# Patient Record
Sex: MALE | Race: WHITE | URBAN - METROPOLITAN AREA
[De-identification: names, ages, dates, MRNs, and addresses within clinical notes are randomized per-mention and may not be internally consistent; named-entity substitution may affect disease eponyms.]

---

## 2020-09-09 PROBLEM — E03.9 ACQUIRED HYPOTHYROIDISM: Status: ACTIVE | Noted: 2020-09-09

## 2020-09-09 PROBLEM — E11.65 UNCONTROLLED TYPE 2 DIABETES MELLITUS WITH HYPERGLYCEMIA (HCC): Status: ACTIVE | Noted: 2020-09-09

## 2020-09-09 PROBLEM — E55.9 VITAMIN D DEFICIENCY: Status: ACTIVE | Noted: 2020-09-09

## 2020-09-10 ENCOUNTER — OFFICE VISIT (OUTPATIENT)
Dept: PRIMARY CARE CLINIC | Age: 60
End: 2020-09-10
Payer: MEDICARE

## 2020-09-10 VITALS
BODY MASS INDEX: 25.34 KG/M2 | TEMPERATURE: 97.3 F | SYSTOLIC BLOOD PRESSURE: 195 MMHG | DIASTOLIC BLOOD PRESSURE: 101 MMHG | HEART RATE: 85 BPM | WEIGHT: 181 LBS

## 2020-09-10 DIAGNOSIS — E55.9 VITAMIN D DEFICIENCY: ICD-10-CM

## 2020-09-10 DIAGNOSIS — E11.65 UNCONTROLLED TYPE 2 DIABETES MELLITUS WITH HYPERGLYCEMIA (HCC): ICD-10-CM

## 2020-09-10 LAB
A/G RATIO: 1.2 (ref 1.1–2.2)
ALBUMIN SERPL-MCNC: 3.7 G/DL (ref 3.4–5)
ALP BLD-CCNC: 64 U/L (ref 40–129)
ALT SERPL-CCNC: 13 U/L (ref 10–40)
ANION GAP SERPL CALCULATED.3IONS-SCNC: 12 MMOL/L (ref 3–16)
AST SERPL-CCNC: 24 U/L (ref 15–37)
BILIRUB SERPL-MCNC: 1 MG/DL (ref 0–1)
BUN BLDV-MCNC: 10 MG/DL (ref 7–20)
CALCIUM SERPL-MCNC: 9.4 MG/DL (ref 8.3–10.6)
CHLORIDE BLD-SCNC: 104 MMOL/L (ref 99–110)
CHOLESTEROL, TOTAL: 111 MG/DL (ref 0–199)
CO2: 26 MMOL/L (ref 21–32)
CREAT SERPL-MCNC: 1 MG/DL (ref 0.8–1.3)
GFR AFRICAN AMERICAN: >60
GFR NON-AFRICAN AMERICAN: >60
GLOBULIN: 3.1 G/DL
GLUCOSE BLD-MCNC: 180 MG/DL (ref 70–99)
HBA1C MFR BLD: 8 %
HDLC SERPL-MCNC: 41 MG/DL (ref 40–60)
LDL CHOLESTEROL CALCULATED: 54 MG/DL
POTASSIUM SERPL-SCNC: 3.7 MMOL/L (ref 3.5–5.1)
SODIUM BLD-SCNC: 142 MMOL/L (ref 136–145)
TOTAL PROTEIN: 6.8 G/DL (ref 6.4–8.2)
TRIGL SERPL-MCNC: 78 MG/DL (ref 0–150)
TSH SERPL DL<=0.05 MIU/L-ACNC: 4.52 UIU/ML (ref 0.27–4.2)
VLDLC SERPL CALC-MCNC: 16 MG/DL

## 2020-09-10 PROCEDURE — 99205 OFFICE O/P NEW HI 60 MIN: CPT | Performed by: FAMILY MEDICINE

## 2020-09-10 PROCEDURE — 3017F COLORECTAL CA SCREEN DOC REV: CPT | Performed by: FAMILY MEDICINE

## 2020-09-10 PROCEDURE — G8427 DOCREV CUR MEDS BY ELIG CLIN: HCPCS | Performed by: FAMILY MEDICINE

## 2020-09-10 PROCEDURE — 1036F TOBACCO NON-USER: CPT | Performed by: FAMILY MEDICINE

## 2020-09-10 PROCEDURE — 83036 HEMOGLOBIN GLYCOSYLATED A1C: CPT | Performed by: FAMILY MEDICINE

## 2020-09-10 PROCEDURE — G8419 CALC BMI OUT NRM PARAM NOF/U: HCPCS | Performed by: FAMILY MEDICINE

## 2020-09-10 PROCEDURE — 2022F DILAT RTA XM EVC RTNOPTHY: CPT | Performed by: FAMILY MEDICINE

## 2020-09-10 PROCEDURE — 3052F HG A1C>EQUAL 8.0%<EQUAL 9.0%: CPT | Performed by: FAMILY MEDICINE

## 2020-09-10 RX ORDER — LISINOPRIL 40 MG/1
40 TABLET ORAL DAILY
Qty: 90 TABLET | Refills: 1 | Status: SHIPPED | OUTPATIENT
Start: 2020-09-10 | End: 2020-12-30 | Stop reason: SDUPTHER

## 2020-09-10 RX ORDER — ASPIRIN 81 MG/1
81 TABLET ORAL DAILY
Qty: 30 TABLET | Refills: 3 | Status: SHIPPED | OUTPATIENT
Start: 2020-09-10

## 2020-09-10 RX ORDER — LEVOTHYROXINE SODIUM 0.07 MG/1
75 TABLET ORAL DAILY
Qty: 90 TABLET | Refills: 1 | Status: SHIPPED | OUTPATIENT
Start: 2020-09-10

## 2020-09-10 RX ORDER — METOPROLOL SUCCINATE 100 MG/1
100 TABLET, EXTENDED RELEASE ORAL DAILY
Qty: 30 TABLET | Refills: 3 | Status: SHIPPED | OUTPATIENT
Start: 2020-09-10

## 2020-09-10 RX ORDER — LANCETS
EACH MISCELLANEOUS
COMMUNITY
Start: 2019-07-12 | End: 2020-09-10

## 2020-09-10 RX ORDER — CLOPIDOGREL BISULFATE 75 MG/1
75 TABLET ORAL DAILY
Qty: 30 TABLET | Refills: 3 | Status: SHIPPED | OUTPATIENT
Start: 2020-09-10

## 2020-09-10 ASSESSMENT — PATIENT HEALTH QUESTIONNAIRE - PHQ9
SUM OF ALL RESPONSES TO PHQ9 QUESTIONS 1 & 2: 0
2. FEELING DOWN, DEPRESSED OR HOPELESS: 0
SUM OF ALL RESPONSES TO PHQ QUESTIONS 1-9: 0
1. LITTLE INTEREST OR PLEASURE IN DOING THINGS: 0
SUM OF ALL RESPONSES TO PHQ QUESTIONS 1-9: 0

## 2020-09-10 ASSESSMENT — ENCOUNTER SYMPTOMS
SHORTNESS OF BREATH: 0
CONSTIPATION: 0
SORE THROAT: 0
COUGH: 0
NAUSEA: 0
RHINORRHEA: 0
ABDOMINAL PAIN: 0
VOMITING: 0
COLOR CHANGE: 0
DIARRHEA: 0
EYE PAIN: 0

## 2020-09-10 NOTE — PATIENT INSTRUCTIONS
See me in 2 weeks. Get established with a Family Doctor and Cardiologist in [facility] as  Soon as possible. Examine your lifestyle and the barriers to bad and good habits and how you can design your life to make better choices    If you want to feel better these are the FUNDAMENTAL PILLARS of Wellness:    Make it EASY to do the RIGHT THINGS. 1)  You can choose to Get 150 min/week of moderate exercise (can talk but can't sing) or 75 min/week of vigorous exercise (can't talk)   This will enhance your sense of well being (Exercise is as good as medicine for depression.)    2)  You can choose to Get 7-9 hours of sleep per night    Detoxifies your brain, reduces risk of dementia    3)  You can choose to Strength Train 2 x a week on non-consecutive days   This will improve function and reduce risk of injury. Body weight type exercises such as Yoga and Pilates are good    4)  You can choose good nutrition. Only eat your goal weight (in lbs) x 10 calories/day and get 5 servings of Vegetables/day   Plant based diets reduce risk of heart attack/stroke and will help you feel full on less food. Avoid highly processed foods and processed carbohydrates. 5)  You can choose moderate alcohol intake < 1-2 drinks/day   Alcohol will disrupt your sleep and add calories to your day    6)  You can choose to develop a Charismatic/Supportive relationship. This will strengthen your resilience for the ups and downs. 7)  You can choose to Practice Mindfulness. An hour a day of prayer/meditation/gratitude will change your life! If you are trying to lose weight, here are some recommendations for weight loss:  Not every weight loss program is appropriate for everybody. ..  good online sources include Noom, The GI Diet or \"Primal diet\", Intermittent fasting. If you have diabetes treated with insulin be sure to ask me for specific guidance around meals.     Take your desired weight in pounds and multiply by 10 and that is your average daily calorie allowance. For example if you wish to weigh 170 lb x 10 = 1700 jaleel/day (this is how to gradually lose the weight and maintain your desired weight). Avoid soda/coke and all \"wet carbs\" => Drink ice water instead    Drink a large glass of ice water before meals and EAT SLOWLY (talk while you eat)! Rethink your hunger => it means your losing weight.     Minimize highly processed carbohydrates as they stimulate your appetite:  Specifically cut back on Bread, Rice, Pasta and Potatoes    Avoid eating calories after 6 pm

## 2020-09-10 NOTE — PROGRESS NOTES
Chief Complaint   Patient presents with    Diabetes    Coronary Artery Disease    Hyperlipidemia    Seizures    Thyroid Problem    Other     vitamin D        HPI:Gurwinder Varner presents for evaluation and management of multiple medical problems is a highly comorbid, complicated, and at risk patient. He has a history of coronary artery disease stating he had a heart attack but denies any intervention such as stenting or bypass surgery. He denies chest pain at this time but is not taking any cholesterol, anticoagulant medications. He is not taking all of the blood pressure medications he was supposed to be taking either. He has a history of seizure disorder but does not take any medicine for this and tells me that he is not had a seizure\" in a long time \". He has been working to manage his diabetes with weight loss and has been compliant with metformin. Has not been checking his blood sugars. He is taking and tolerating his lisinopril for his blood pressure. Notes no lightheadedness dizziness or cough. He has a history of hypothyroidism and has not been taking thyroid medication. He does note decreased energy. Review of Systems   Constitutional: Positive for fatigue. Negative for chills and fever. HENT: Negative for ear pain, rhinorrhea and sore throat. Eyes: Negative for pain and visual disturbance. Respiratory: Negative for cough and shortness of breath. Cardiovascular: Negative for chest pain and palpitations. Gastrointestinal: Negative for abdominal pain, constipation, diarrhea, nausea and vomiting. Genitourinary: Negative for dysuria and frequency. Musculoskeletal: Negative for joint swelling and myalgias. Skin: Negative for color change and rash. Neurological: Negative for weakness, numbness and headaches. Notes numbness and pain in bilateral feet   Hematological: Negative for adenopathy. Does not bruise/bleed easily.    Psychiatric/Behavioral: Negative for dysphoric mood, self-injury and suicidal ideas. The patient is not nervous/anxious. No Known Allergies  New Prescriptions    LEVOTHYROXINE (SYNTHROID) 75 MCG TABLET    Take 1 tablet by mouth daily     Current Outpatient Medications   Medication Sig Dispense Refill    metFORMIN (GLUCOPHAGE) 1000 MG tablet TAKE 1 TABLET BY MOUTH TWICE DAILY.  aspirin 81 MG EC tablet Take 1 tablet by mouth daily 30 tablet 3    clopidogrel (PLAVIX) 75 MG tablet Take 1 tablet by mouth daily 30 tablet 3    levothyroxine (SYNTHROID) 75 MCG tablet Take 1 tablet by mouth daily 90 tablet 1    metoprolol succinate (TOPROL XL) 100 MG extended release tablet Take 1 tablet by mouth daily 30 tablet 3    lisinopril (PRINIVIL;ZESTRIL) 40 MG tablet Take 1 tablet by mouth daily 90 tablet 1    atorvastatin (LIPITOR) 80 MG tablet Take 1 tablet by mouth nightly. 30 tablet 3     No current facility-administered medications for this visit.         Past Medical History:   Diagnosis Date    Acquired hypothyroidism 9/9/2020    CAD (coronary artery disease)     CVA (cerebral infarction)     Diabetes mellitus ([facility]     Hereditary spherocytosis ([facility]     Hyperlipidemia     Hypertension     Uncontrolled type 2 diabetes mellitus with hyperglycemia ([facility] 9/9/2020    Vitamin D deficiency 9/9/2020     Past Surgical History:   Procedure Laterality Date    SPLENECTOMY, TOTAL      VASECTOMY       Family History   Problem Relation Age of Onset    Diabetes Mother     Cancer Mother     Hypertension Father     Diabetes Father      Social History     Tobacco Use    Smoking status: Never Smoker    Smokeless tobacco: Never Used   Substance Use Topics    Alcohol use: No    Drug use: No       Objective   BP (!) 195/101   Pulse 85   Temp 97.3 °F (36.3 °C) (Oral)   Wt 181 lb (82.1 kg)   BMI 25.34 kg/m²   Wt Readings from Last 3 Encounters:   09/10/20 181 lb (82.1 kg)       Physical Exam  Constitutional:       Appearance: He is BUN 10 08/13/2015 0437    CREATININE 0.8 (L) 08/13/2015 0437        Component Value Date/Time    CALCIUM 9.0 08/13/2015 0437    ALKPHOS 61 08/06/2015 0428    AST 22 08/06/2015 0428    ALT 13 08/06/2015 0428    BILITOT 0.7 08/06/2015 0428          Lab Results   Component Value Date    WBC 17.1 (H) 08/13/2015    HGB 15.1 08/13/2015    HCT 41.9 08/13/2015    MCV 94.3 08/13/2015     (H) 08/13/2015     Lab Results   Component Value Date    LABA1C 9.9 10/09/2014    LABA1C 9.9 10/09/2014     Lab Results   Component Value Date    .4 10/09/2014    .4 10/09/2014     Lab Results   Component Value Date    LABA1C 8.0 09/10/2020     No components found for: CHLPL  Lab Results   Component Value Date    TRIG 193 (H) 10/09/2014     Lab Results   Component Value Date    HDL 38 (L) 10/09/2014     Lab Results   Component Value Date    LDLCALC 101 (H) 10/09/2014     Lab Results   Component Value Date    LABVLDL 39 10/09/2014         Assessment   Plan     1. Uncontrolled type 2 diabetes mellitus with hyperglycemia ([facility]  Counseled patient to continue his medications and praised efforts on diet. Encourage patient to examine his feet every night for skin breakdown due to loss of sensation  - Lipid Panel; Future  - Comprehensive Metabolic Panel; Future  - POCT glycosylated hemoglobin (Hb A1C)  - TSH without Reflex; Future  -  DIABETES FOOT EXAM  - lisinopril (PRINIVIL;ZESTRIL) 40 MG tablet; Take 1 tablet by mouth daily  Dispense: 90 tablet; Refill: 1    2. Seizure ([address]  Appears stable off medication at this time. [facility]    3. Essential hypertension  We will restart his metoprolol and continue his lisinopril and asked patient to come back to clinic in 2 weeks for recheck.   He is planning to move to [place] so this will have to be followed up there and likely have additional titration of medication  - metoprolol succinate (TOPROL XL) 100 MG extended release tablet; Take 1 tablet by mouth daily  Dispense: 30 tablet; Refill: 3  - lisinopril (PRINIVIL;ZESTRIL) 40 MG tablet; Take 1 tablet by mouth daily  Dispense: 90 tablet; Refill: 1    4. Pure hypercholesterolemia  Treating: We will check labs and follow-up in 2 weeks    5. Coronary artery disease involving native coronary artery of native heart, angina presence unspecified  We will restart patient's aspirin and Plavix and beta-blocker therapy. Encourage patient to establish with a cardiologist when he moved to [place]  - aspirin 81 MG EC tablet; Take 1 tablet by mouth daily  Dispense: 30 tablet; Refill: 3  - clopidogrel (PLAVIX) 75 MG tablet; Take 1 tablet by mouth daily  Dispense: 30 tablet; Refill: 3  - metoprolol succinate (TOPROL XL) 100 MG extended release tablet; Take 1 tablet by mouth daily  Dispense: 30 tablet; Refill: 3    6. Vitamin D deficiency  Check levels today  - Vitamin D 25 Hydroxy; Future    7. Screen for colon cancer  Encourage patient to complete colon cancer screening with [facility]; Future    8. Acquired hypothyroidism  Untreated at this time likely related to patient's fatigue. We will start his Synthroid and follow-up in 2 to 4 weeks  - levothyroxine (SYNTHROID) 75 MCG tablet; Take 1 tablet by mouth daily  Dispense: 90 tablet; Refill: 1  Discussed use, benefit, and side effects of prescribed medications. Barriers to medication compliance addressed. All patient questions answered. Pt voiced understanding.          Health Maintenance   Topic Date Due    Meningococcal (ACWY) vaccine (1 - Risk start before 7 months 4-dose series) 1960    Hib vaccine (1 of 1 - Risk 1-dose series) 06/30/1959    Diabetic foot exam  03/31/1968    Diabetic retinal exam  03/31/1968    Meningococcal B vaccine (1 of 4 - Increased Risk Bexsero 2-dose series) 03/31/1968    Diabetic microalbuminuria test  03/31/1976    Shingles Vaccine (1 of 2) 03/31/2008    Colon cancer screen colonoscopy 03/31/2008    A1C test (Diabetic or Prediabetic)  10/09/2015    Lipid screen  10/09/2015    TSH testing  08/05/2016    Potassium monitoring  08/13/2016    Creatinine monitoring  08/13/2016    Pneumococcal 0-64 years Vaccine (3 of 3 - PPSV23) 01/11/2018    Flu vaccine (1) 09/01/2020    Annual Wellness Visit (AWV)  09/02/2020    DTaP/Tdap/Td vaccine (2 - Td) 09/07/2021    Hepatitis C screen  Completed    HIV screen  Completed    Hepatitis A vaccine  Aged Out       RTC 2 weeks

## 2020-09-11 LAB — VITAMIN D 25-HYDROXY: 21.1 NG/ML

## 2020-09-11 RX ORDER — ERGOCALCIFEROL 1.25 MG/1
50000 CAPSULE ORAL WEEKLY
Qty: 12 CAPSULE | Refills: 1 | Status: SHIPPED | OUTPATIENT
Start: 2020-09-11

## 2020-09-15 ENCOUNTER — TELEPHONE (OUTPATIENT)
Dept: PRIMARY CARE CLINIC | Age: 60
End: 2020-09-15

## 2020-09-24 ENCOUNTER — TELEPHONE (OUTPATIENT)
Dept: PRIMARY CARE CLINIC | Age: 60
End: 2020-09-24

## 2020-09-24 NOTE — TELEPHONE ENCOUNTER
----- Message from Yazan Davalos sent at 9/24/2020  4:29 PM EDT -----  Subject: Message to Provider    QUESTIONS  Information for Provider? on his 09/25 appointment he wants to know if he   is getting urine test and colon test?  ---------------------------------------------------------------------------  --------------  CALL BACK INFO  What is the best way for the office to contact you? OK to leave message on   voicemail  Preferred Call Back Phone Number? [phone]  ---------------------------------------------------------------------------  --------------  SCRIPT ANSWERS  Relationship to Patient?  Self

## 2020-09-25 ENCOUNTER — OFFICE VISIT (OUTPATIENT)
Dept: PRIMARY CARE CLINIC | Age: 60
End: 2020-09-25
Payer: MEDICARE

## 2020-09-25 VITALS
SYSTOLIC BLOOD PRESSURE: 168 MMHG | BODY MASS INDEX: 24.84 KG/M2 | TEMPERATURE: 97.1 F | HEART RATE: 77 BPM | WEIGHT: 177.4 LBS | DIASTOLIC BLOOD PRESSURE: 88 MMHG

## 2020-09-25 PROCEDURE — 3017F COLORECTAL CA SCREEN DOC REV: CPT | Performed by: FAMILY MEDICINE

## 2020-09-25 PROCEDURE — 3052F HG A1C>EQUAL 8.0%<EQUAL 9.0%: CPT | Performed by: FAMILY MEDICINE

## 2020-09-25 PROCEDURE — 2022F DILAT RTA XM EVC RTNOPTHY: CPT | Performed by: FAMILY MEDICINE

## 2020-09-25 PROCEDURE — G8427 DOCREV CUR MEDS BY ELIG CLIN: HCPCS | Performed by: FAMILY MEDICINE

## 2020-09-25 PROCEDURE — 1036F TOBACCO NON-USER: CPT | Performed by: FAMILY MEDICINE

## 2020-09-25 PROCEDURE — 99215 OFFICE O/P EST HI 40 MIN: CPT | Performed by: FAMILY MEDICINE

## 2020-09-25 PROCEDURE — G8420 CALC BMI NORM PARAMETERS: HCPCS | Performed by: FAMILY MEDICINE

## 2020-09-25 RX ORDER — HYDROCHLOROTHIAZIDE 25 MG/1
25 TABLET ORAL EVERY MORNING
Qty: 30 TABLET | Refills: 5 | Status: SHIPPED | OUTPATIENT
Start: 2020-09-25

## 2020-09-25 RX ORDER — SILDENAFIL CITRATE 20 MG/1
TABLET ORAL
Qty: 30 TABLET | Refills: 3 | Status: SHIPPED | OUTPATIENT
Start: 2020-09-25

## 2020-09-25 RX ORDER — PAROXETINE HYDROCHLORIDE 20 MG/1
20 TABLET, FILM COATED ORAL DAILY
Qty: 30 TABLET | Refills: 3 | Status: SHIPPED | OUTPATIENT
Start: 2020-09-25

## 2020-09-25 RX ORDER — TRAZODONE HYDROCHLORIDE 50 MG/1
50 TABLET ORAL NIGHTLY
Qty: 90 TABLET | Refills: 1 | Status: SHIPPED | OUTPATIENT
Start: 2020-09-25

## 2020-09-25 ASSESSMENT — ENCOUNTER SYMPTOMS
ABDOMINAL PAIN: 0
VOMITING: 0
CONSTIPATION: 0
SHORTNESS OF BREATH: 0
DIARRHEA: 0
COUGH: 0
RHINORRHEA: 0
NAUSEA: 0
COLOR CHANGE: 0
EYE PAIN: 0
SORE THROAT: 0

## 2020-09-25 NOTE — PROGRESS NOTES
Prescriptions    HYDROCHLOROTHIAZIDE (HYDRODIURIL) 25 MG TABLET    Take 1 tablet by mouth every morning    PAROXETINE (PAXIL) 20 MG TABLET    Take 1 tablet by mouth daily    SILDENAFIL (REVATIO) 20 MG TABLET    Take 1-5 a day as needed on an empty stomach    TRAZODONE (DESYREL) 50 MG TABLET    Take 1 tablet by mouth nightly     Current Outpatient Medications   Medication Sig Dispense Refill    vitamin D (ERGOCALCIFEROL) 1.25 MG (33530 UT) CAPS capsule Take 1 capsule by mouth once a week 12 capsule 1    metFORMIN (GLUCOPHAGE) 1000 MG tablet TAKE 1 TABLET BY MOUTH TWICE DAILY.  aspirin 81 MG EC tablet Take 1 tablet by mouth daily 30 tablet 3    clopidogrel (PLAVIX) 75 MG tablet Take 1 tablet by mouth daily 30 tablet 3    levothyroxine (SYNTHROID) 75 MCG tablet Take 1 tablet by mouth daily 90 tablet 1    metoprolol succinate (TOPROL XL) 100 MG extended release tablet Take 1 tablet by mouth daily 30 tablet 3    lisinopril (PRINIVIL;ZESTRIL) 40 MG tablet Take 1 tablet by mouth daily 90 tablet 1    atorvastatin (LIPITOR) 80 MG tablet Take 1 tablet by mouth nightly. 30 tablet 3     No current facility-administered medications for this visit. Past Medical History:   Diagnosis Date    Acquired hypothyroidism 9/9/2020    CAD (coronary artery disease)     CVA (cerebral infarction)     Diabetes mellitus ([facility]     Hereditary spherocytosis ([facility]     Hyperlipidemia     Hypertension     Uncontrolled type 2 diabetes mellitus with hyperglycemia ([facility] 9/9/2020    Vitamin D deficiency 9/9/2020         Objective   BP (!) 168/88   Pulse 77   Temp 97.1 °F (36.2 °C) (Oral)   Wt 177 lb 6.4 oz (80.5 kg)   BMI 24.84 kg/m²   Wt Readings from Last 3 Encounters:   09/25/20 177 lb 6.4 oz (80.5 kg)   09/10/20 181 lb (82.1 kg)       Physical Exam  Constitutional:       Appearance: He is well-developed. Cardiovascular:      Rate and Rhythm: Normal rate and regular rhythm. Heart sounds: No murmur.  No friction hydrochlorothiazide. Patient is to follow-up with a new doctor in [facility] [address] within the next 30 days to establish care  - hydroCHLOROthiazide (HYDRODIURIL) 25 MG tablet; Take 1 tablet by mouth every morning  Dispense: 30 tablet; Refill: 5    3. Uncontrolled type 2 diabetes mellitus with hyperglycemia ([facility]  Marginal control: Patient has resumed his medications. He is instructed to follow-up with a new doctor in [address] [place], [place] [address] after he he moves    4. Acquired hypothyroidism  Treating: Continue meds and check labs in 4 weeks    5. Moderate episode of recurrent major depressive disorder ([facility]  New: We will trial Paxil and trazodone for symptoms of insomnia. He is instructed to follow-up with the doctor in the next 4 weeks in [address] [place], [place] [address]. I asked him to sign up for my chart and follow-up with me virtually in the meantime  - PARoxetine (PAXIL) 20 MG tablet; Take 1 tablet by mouth daily  Dispense: 30 tablet; Refill: 3  - traZODone (DESYREL) 50 MG tablet; Take 1 tablet by mouth nightly  Dispense: 90 tablet; Refill: 1    6. Erectile dysfunction, unspecified erectile dysfunction type  Patient complains of poor erections. We will start him on generic Viagra and follow-up virtually  - sildenafil (REVATIO) 20 MG tablet; Take 1-5 a day as needed on an empty stomach  Dispense: 30 tablet; Refill: 3    Discussed use, benefit, and side effects of prescribed medications. Barriers to medication compliance addressed. All patient questions answered. Pt voiced understanding.        Health Maintenance   Topic Date Due    Meningococcal (ACWY) vaccine (1 - Risk start before 7 months 4-dose series) 1958    Hib vaccine (1 of 1 - Risk 1-dose series) 06/30/1959    Diabetic retinal exam  03/31/1968    Meningococcal B vaccine (1 of 4 - Increased Risk Bexsero 2-dose series) 03/31/1968    Diabetic microalbuminuria test  03/31/1976    Shingles Vaccine (1 of 2) 03/31/2008    Colon cancer screen colonoscopy  03/31/2008    Flu vaccine (1) 09/01/2020    Annual Wellness Visit (AWV)  09/02/2020    Diabetic foot exam  09/10/2021    A1C test (Diabetic or Prediabetic)  09/10/2021    Lipid screen  09/10/2021    TSH testing  09/10/2021    Potassium monitoring  09/10/2021    Creatinine monitoring  09/10/2021    DTaP/Tdap/Td vaccine (3 - Td) 12/27/2023    Pneumococcal 0-64 years Vaccine  Completed    Hepatitis C screen  Completed    HIV screen  Completed    Hepatitis A vaccine  Aged Out       RTC 1 month and as needed

## 2020-10-26 ENCOUNTER — TELEPHONE (OUTPATIENT)
Dept: PHARMACY | Facility: CLINIC | Age: 60
End: 2020-10-26

## 2020-10-26 NOTE — LETTER
[address]  [address]  Phone: [phone], option 7  Fax: [phone] [facility] [address]  [address] [facility] [place] [address]           10/26/20     Dear Ignacia Mott,    We tried to reach you recently regarding your medications, but were unable to reach you on the telephone. We wanted to confirm your medications - it appears that some of them have not been filled at proper times. We are worried you might be missing doses or not taking it as directed. It is important that you take your medications regularly and try not to miss a single dose. We have on file you are currently taking lisinopril 40 mg tablets - 1 tablet everyday. Please contact us if you are no longer taking this or if you are taking differently than above. Please contact us if we can provide any assistance with your medications.     Sincerely,     [address]  Phone: [phone], option 7

## 2020-10-26 NOTE — TELEPHONE ENCOUNTER
CLINICAL PHARMACY: ADHERENCE REVIEW  Identified care gap per [employer]; fills at [facility]: ACE/ARB adherence    Last Office Visit: 20 - noted pt planning to move to [place] and planning to f/u with the month    Patient also appears to be taking: atorvastatin on med list - written date ; metformin - last Rx now     ASSESSMENT  ACE/ARB ADHERENCE  PDC: 100% - one fill    Per Insurance Records   Lisinopril 40 mg tabs last filled on 20 for a 30 day supply; refill due date 10/11/20 (potential fail date 20; 60 days to adherent)  Per reconcile dispense claim 10/24/20 x 30 ds  Confirmed with pharmacy they have Rxs ready for  - [facility] said he spoke with patient recently and was told he is already in [place]. BP Readings from Last 3 Encounters:   20 (!) 168/88   09/10/20 (!) 195/101     CrCl cannot be calculated (Unknown ideal weight.). DIABETES ADHERENCE  PDC: not identified via insurance report - no claims in     Lab Results   Component Value Date    LABA1C 8.0 09/10/2020    LABA1C 9.9 10/09/2014    LABA1C 9.9 10/09/2014       STATIN ADHERENCE  PDC: not identified via insurance report - no claims in     Lab Results   Component Value Date    CHOL 111 09/10/2020    TRIG 78 09/10/2020    HDL 41 09/10/2020    LDLCALC 54 09/10/2020     ALT   Date Value Ref Range Status   09/10/2020 13 10 - 40 U/L Final     AST   Date Value Ref Range Status   09/10/2020 24 15 - 37 U/L Final     The ASCVD Risk score (Lazarus Moy., et al., 2013) failed to calculate for the following reasons: The valid total cholesterol range is 130 to 320 mg/dL     PLAN  Attempt made to reach patient to see if we could assist with any Rxs (seems already moved to [place], has Rxs ready for  at [facility] in [place]). Only number on file is no longer in service. Will try to send letter but noted it is still [place] address - perhaps has mail being forwarded.     Herbert Kelly, PharmD, [facility] Clinical Pharmacist  Dept: [phone] (toll free [phone], option 7)     For Pharmacy Admin Tracking Only    PHSO: Yes  Total # of Interventions Recommended: 1  - New Order #: 1 New Medication Order Reason(s): Adherence  - Refills Provided #: 0  - Updated Order #: 0 Updated Order Reason(s):  Other  - Maintenance Safety Lab Monitoring #: 1  Recommended intervention potential cost savings: 0  Total Interventions Accepted: 0  Time Spent (min): 15

## 2020-12-30 RX ORDER — LISINOPRIL 40 MG/1
40 TABLET ORAL DAILY
Qty: 90 TABLET | Refills: 1 | Status: SHIPPED | OUTPATIENT
Start: 2020-12-30